# Patient Record
Sex: FEMALE | Race: BLACK OR AFRICAN AMERICAN | NOT HISPANIC OR LATINO | Employment: PART TIME | URBAN - METROPOLITAN AREA
[De-identification: names, ages, dates, MRNs, and addresses within clinical notes are randomized per-mention and may not be internally consistent; named-entity substitution may affect disease eponyms.]

---

## 2024-09-26 ENCOUNTER — TELEPHONE (OUTPATIENT)
Dept: OBSTETRICS AND GYNECOLOGY | Facility: CLINIC | Age: 69
End: 2024-09-26

## 2024-09-26 DIAGNOSIS — Z12.31 ENCOUNTER FOR SCREENING MAMMOGRAM FOR MALIGNANT NEOPLASM OF BREAST: Primary | ICD-10-CM

## 2024-09-26 NOTE — TELEPHONE ENCOUNTER
Called pt in regards to scheduling, pt states she is waiting on insurance company to confirm approval. Mammo orders placed and faxed to [facility]. Pt verbalized understanding.

## 2024-09-26 NOTE — TELEPHONE ENCOUNTER
----- Message from Louis Ye sent at 9/26/2024  9:02 AM CDT -----  Contact: pt  Type:   Appointment Request    Caller is requesting a sooner appointment.  Caller declined first available appointment listed below.  Caller will not accept being placed on the waitlist and is requesting a message be sent to doctor.  Name of Caller:pt   Symptoms:mammo  Would the patient rather a call back or a response via [facility]? call  Best Call Back Number:[phone]  Additional Information:   Pt requesting a call for mammo   Pt also have questions for provider